# Patient Record
Sex: FEMALE | Race: WHITE | NOT HISPANIC OR LATINO | URBAN - NONMETROPOLITAN AREA
[De-identification: names, ages, dates, MRNs, and addresses within clinical notes are randomized per-mention and may not be internally consistent; named-entity substitution may affect disease eponyms.]

---

## 2018-07-20 PROBLEM — H26.493: Noted: 2018-07-20

## 2018-07-20 PROBLEM — Z96.1: Noted: 2018-07-20

## 2018-07-20 PROBLEM — H35.373: Noted: 2018-07-20

## 2018-07-20 PROBLEM — H35.363: Noted: 2018-07-20

## 2019-01-04 ENCOUNTER — IMPORTED ENCOUNTER (OUTPATIENT)
Dept: URBAN - NONMETROPOLITAN AREA CLINIC 1 | Facility: CLINIC | Age: 74
End: 2019-01-04

## 2019-01-04 PROCEDURE — 92134 CPTRZ OPH DX IMG PST SGM RTA: CPT

## 2019-01-04 PROCEDURE — 92012 INTRM OPH EXAM EST PATIENT: CPT

## 2019-01-04 NOTE — PATIENT DISCUSSION
Drusen OU ERM OU-continue to monitor-OCT MAC performed and reviewed with pt 01/04/19 see scan for interpretation s/p Retinal Tear OS - Dr. Rojas Pérez monitorEarly PCO-Explained symptoms of advancing PCO. -Continue to monitor for now. Pt will notify us if any new symptoms develop. RTC:  follow up with Dr. Andrea Elder Pucker OCT MAC

## 2021-03-15 ENCOUNTER — IMPORTED ENCOUNTER (OUTPATIENT)
Dept: URBAN - NONMETROPOLITAN AREA CLINIC 1 | Facility: CLINIC | Age: 76
End: 2021-03-15

## 2021-03-15 PROBLEM — H35.363: Noted: 2021-03-15

## 2021-03-15 PROBLEM — H26.493: Noted: 2021-03-15

## 2021-03-15 PROBLEM — H35.373: Noted: 2021-03-15

## 2021-03-15 PROBLEM — H52.4: Noted: 2021-03-15

## 2021-03-15 PROBLEM — Z96.1: Noted: 2021-03-15

## 2021-03-15 PROCEDURE — 92014 COMPRE OPH EXAM EST PT 1/>: CPT

## 2021-03-15 PROCEDURE — 92134 CPTRZ OPH DX IMG PST SGM RTA: CPT

## 2021-03-15 PROCEDURE — 92015 DETERMINE REFRACTIVE STATE: CPT

## 2021-03-15 NOTE — PATIENT DISCUSSION
Drusen OU ERM OU-continue to monitor-OCT MAC performed and reviewed with pt  s/p Retinal Tear OS - Dr. Emerita Arnold monitor

## 2021-04-23 ENCOUNTER — IMPORTED ENCOUNTER (OUTPATIENT)
Dept: URBAN - NONMETROPOLITAN AREA CLINIC 1 | Facility: CLINIC | Age: 76
End: 2021-04-23

## 2021-04-23 PROCEDURE — 99024 POSTOP FOLLOW-UP VISIT: CPT

## 2021-04-23 NOTE — PATIENT DISCUSSION
Drusen OU ERM OU-continue to monitor-OCT MAC performed and reviewed with pt  s/p Retinal Tear OS - Dr. Elnoria Duverney monitor

## 2021-04-30 ENCOUNTER — IMPORTED ENCOUNTER (OUTPATIENT)
Dept: URBAN - NONMETROPOLITAN AREA CLINIC 1 | Facility: CLINIC | Age: 76
End: 2021-04-30

## 2021-04-30 PROCEDURE — 99024 POSTOP FOLLOW-UP VISIT: CPT

## 2021-04-30 NOTE — PATIENT DISCUSSION
conjunctival heme os discussed w/pt recommend pt use EO prn for comfortIlevro prn/qd OS Pt to f/u w/Orestes Hernandez

## 2021-05-03 PROBLEM — H11.32: Noted: 2021-05-03

## 2021-05-03 PROBLEM — H35.363: Noted: 2021-03-15

## 2021-05-03 PROBLEM — H26.493: Noted: 2021-03-15

## 2021-05-03 PROBLEM — Z96.1: Noted: 2021-03-15

## 2021-05-03 PROBLEM — H35.373: Noted: 2021-03-15

## 2021-05-06 ENCOUNTER — IMPORTED ENCOUNTER (OUTPATIENT)
Dept: URBAN - NONMETROPOLITAN AREA CLINIC 1 | Facility: CLINIC | Age: 76
End: 2021-05-06

## 2021-05-06 PROBLEM — H44.009: Noted: 2021-05-06

## 2021-05-06 PROBLEM — H26.493: Noted: 2021-05-06

## 2021-05-06 PROBLEM — Z96.1: Noted: 2021-05-06

## 2021-05-06 PROBLEM — H35.373: Noted: 2021-05-06

## 2021-05-06 PROBLEM — H35.363: Noted: 2021-05-06

## 2021-05-06 PROCEDURE — 92012 INTRM OPH EXAM EST PATIENT: CPT

## 2021-05-06 NOTE — PATIENT DISCUSSION
Endopthalmitis OS-  discussed findings w/ patient-  due to extensive amount of inflammation noted today-  patient will need to be seen by Dr. Delmy Coy immediantly today at 2 pm-  RTC Refer to Dr. Delmy Coy for STAT eval

## 2021-05-28 ENCOUNTER — IMPORTED ENCOUNTER (OUTPATIENT)
Dept: URBAN - NONMETROPOLITAN AREA CLINIC 1 | Facility: CLINIC | Age: 76
End: 2021-05-28

## 2021-05-28 PROCEDURE — 99213 OFFICE O/P EST LOW 20 MIN: CPT

## 2021-05-28 NOTE — PATIENT DISCUSSION
Endopthalmitis OS-  discussed findings w/ patient-  continue meds as per Dr. Severiano Abrams-  continue f/u as per Dr. Severiano Abrams

## 2021-09-02 ENCOUNTER — IMPORTED ENCOUNTER (OUTPATIENT)
Dept: URBAN - NONMETROPOLITAN AREA CLINIC 1 | Facility: CLINIC | Age: 76
End: 2021-09-02

## 2021-09-02 PROCEDURE — 99213 OFFICE O/P EST LOW 20 MIN: CPT

## 2021-09-02 NOTE — PATIENT DISCUSSION
Endopthalmitis OS-  discussed findings w/ patient-  mild cells/striae noted today-  start Prednisolone 1% QID OS x 2 weeks then f/u-  RTC 2 week f/u or prn

## 2021-09-20 ENCOUNTER — IMPORTED ENCOUNTER (OUTPATIENT)
Dept: URBAN - NONMETROPOLITAN AREA CLINIC 1 | Facility: CLINIC | Age: 76
End: 2021-09-20

## 2021-09-20 PROCEDURE — 99213 OFFICE O/P EST LOW 20 MIN: CPT

## 2021-09-20 PROCEDURE — 92015 DETERMINE REFRACTIVE STATE: CPT

## 2021-09-20 NOTE — PATIENT DISCUSSION
Endopthalmitis OS-  discussed findings w/ patient-  much improvement noted at this time-  start taper PF 1% TID OS-  RTC 3 weeks f/u or prnCompound Myopic Astigmatism OU w/Presbyopia-  discussed findings w/patient-  new spectacle Rx issued-  continue to monitor as scheduled or prn; 's Notes: MR 9/20/2021DFEOCT Mac 3/15/2021

## 2022-04-10 ASSESSMENT — VISUAL ACUITY
OD_SC: 20/20
OU_CC: 20/60
OD_SC: 20/30
OS_CC: CF4'
OD_CC: 20/30
OD_CC: 20/30-
OS_CC: CF4'
OD_SC: 20/20
OS_SC: 20/20
OS_SC: 20/30
OS_CC: 20/60
OS_SC: 20/25-
OS_CC: 20/30
OU_CC: 20/60
OD_SC: 20/25+2
OS_PH: 20/50-1
OD_CC: 20/30-2

## 2022-04-10 ASSESSMENT — TONOMETRY
OS_IOP_MMHG: 16
OS_IOP_MMHG: 14
OS_IOP_MMHG: 16
OS_IOP_MMHG: 11
OD_IOP_MMHG: 17
OD_IOP_MMHG: 17
OS_IOP_MMHG: 26
OS_IOP_MMHG: 18
OD_IOP_MMHG: 17
OD_IOP_MMHG: 16
OS_IOP_MMHG: 10
OD_IOP_MMHG: 15
OD_IOP_MMHG: 18
OD_IOP_MMHG: 18